# Patient Record
Sex: FEMALE | Race: WHITE | NOT HISPANIC OR LATINO | Employment: OTHER | URBAN - METROPOLITAN AREA
[De-identification: names, ages, dates, MRNs, and addresses within clinical notes are randomized per-mention and may not be internally consistent; named-entity substitution may affect disease eponyms.]

---

## 2017-05-15 ENCOUNTER — APPOINTMENT (OUTPATIENT)
Dept: SPEECH THERAPY | Facility: CLINIC | Age: 67
End: 2017-05-15
Payer: MEDICARE

## 2017-05-15 PROCEDURE — 92610 EVALUATE SWALLOWING FUNCTION: CPT

## 2017-05-15 PROCEDURE — G8997 SWALLOW GOAL STATUS: HCPCS

## 2017-05-15 PROCEDURE — G8996 SWALLOW CURRENT STATUS: HCPCS

## 2017-05-17 ENCOUNTER — APPOINTMENT (OUTPATIENT)
Dept: SPEECH THERAPY | Facility: CLINIC | Age: 67
End: 2017-05-17
Payer: MEDICARE

## 2017-05-17 PROCEDURE — 92526 ORAL FUNCTION THERAPY: CPT

## 2017-05-22 ENCOUNTER — APPOINTMENT (OUTPATIENT)
Dept: SPEECH THERAPY | Facility: CLINIC | Age: 67
End: 2017-05-22
Payer: MEDICARE

## 2017-05-22 PROCEDURE — 92526 ORAL FUNCTION THERAPY: CPT

## 2017-05-26 ENCOUNTER — APPOINTMENT (OUTPATIENT)
Dept: SPEECH THERAPY | Facility: CLINIC | Age: 67
End: 2017-05-26
Payer: MEDICARE

## 2017-06-01 ENCOUNTER — APPOINTMENT (OUTPATIENT)
Dept: SPEECH THERAPY | Facility: CLINIC | Age: 67
End: 2017-06-01
Payer: MEDICARE

## 2017-06-05 ENCOUNTER — APPOINTMENT (OUTPATIENT)
Dept: SPEECH THERAPY | Facility: CLINIC | Age: 67
End: 2017-06-05
Payer: MEDICARE

## 2017-06-07 ENCOUNTER — APPOINTMENT (OUTPATIENT)
Dept: SPEECH THERAPY | Facility: CLINIC | Age: 67
End: 2017-06-07
Payer: MEDICARE

## 2017-06-12 ENCOUNTER — APPOINTMENT (OUTPATIENT)
Dept: SPEECH THERAPY | Facility: CLINIC | Age: 67
End: 2017-06-12
Payer: MEDICARE

## 2017-06-12 PROCEDURE — 92526 ORAL FUNCTION THERAPY: CPT

## 2017-06-14 ENCOUNTER — APPOINTMENT (OUTPATIENT)
Dept: SPEECH THERAPY | Facility: CLINIC | Age: 67
End: 2017-06-14
Payer: MEDICARE

## 2017-06-14 PROCEDURE — G8997 SWALLOW GOAL STATUS: HCPCS

## 2017-06-14 PROCEDURE — 92526 ORAL FUNCTION THERAPY: CPT

## 2017-06-14 PROCEDURE — G8996 SWALLOW CURRENT STATUS: HCPCS

## 2017-06-19 ENCOUNTER — APPOINTMENT (OUTPATIENT)
Dept: SPEECH THERAPY | Facility: CLINIC | Age: 67
End: 2017-06-19
Payer: MEDICARE

## 2017-06-19 PROCEDURE — 92526 ORAL FUNCTION THERAPY: CPT

## 2017-06-21 ENCOUNTER — APPOINTMENT (OUTPATIENT)
Dept: SPEECH THERAPY | Facility: CLINIC | Age: 67
End: 2017-06-21
Payer: MEDICARE

## 2017-06-26 ENCOUNTER — APPOINTMENT (OUTPATIENT)
Dept: SPEECH THERAPY | Facility: CLINIC | Age: 67
End: 2017-06-26
Payer: MEDICARE

## 2017-06-26 PROCEDURE — 92526 ORAL FUNCTION THERAPY: CPT

## 2017-07-03 ENCOUNTER — APPOINTMENT (OUTPATIENT)
Dept: SPEECH THERAPY | Facility: CLINIC | Age: 67
End: 2017-07-03
Payer: MEDICARE

## 2017-07-03 PROCEDURE — 92526 ORAL FUNCTION THERAPY: CPT

## 2017-07-05 ENCOUNTER — APPOINTMENT (OUTPATIENT)
Dept: SPEECH THERAPY | Facility: CLINIC | Age: 67
End: 2017-07-05
Payer: MEDICARE

## 2017-07-05 PROCEDURE — 92526 ORAL FUNCTION THERAPY: CPT

## 2017-07-10 ENCOUNTER — APPOINTMENT (OUTPATIENT)
Dept: SPEECH THERAPY | Facility: CLINIC | Age: 67
End: 2017-07-10
Payer: MEDICARE

## 2017-07-10 PROCEDURE — 92526 ORAL FUNCTION THERAPY: CPT

## 2017-07-12 ENCOUNTER — APPOINTMENT (OUTPATIENT)
Dept: SPEECH THERAPY | Facility: CLINIC | Age: 67
End: 2017-07-12
Payer: MEDICARE

## 2017-07-12 PROCEDURE — 92526 ORAL FUNCTION THERAPY: CPT

## 2017-07-17 ENCOUNTER — APPOINTMENT (OUTPATIENT)
Dept: SPEECH THERAPY | Facility: CLINIC | Age: 67
End: 2017-07-17
Payer: MEDICARE

## 2017-07-17 PROCEDURE — 92526 ORAL FUNCTION THERAPY: CPT

## 2017-07-19 ENCOUNTER — APPOINTMENT (OUTPATIENT)
Dept: SPEECH THERAPY | Facility: CLINIC | Age: 67
End: 2017-07-19
Payer: MEDICARE

## 2017-07-19 PROCEDURE — 92526 ORAL FUNCTION THERAPY: CPT

## 2017-07-24 ENCOUNTER — APPOINTMENT (OUTPATIENT)
Dept: SPEECH THERAPY | Facility: CLINIC | Age: 67
End: 2017-07-24
Payer: MEDICARE

## 2017-07-24 PROCEDURE — 92526 ORAL FUNCTION THERAPY: CPT

## 2017-07-27 ENCOUNTER — APPOINTMENT (OUTPATIENT)
Dept: SPEECH THERAPY | Facility: CLINIC | Age: 67
End: 2017-07-27
Payer: MEDICARE

## 2017-07-27 PROCEDURE — 92526 ORAL FUNCTION THERAPY: CPT

## 2017-08-01 ENCOUNTER — APPOINTMENT (OUTPATIENT)
Dept: SPEECH THERAPY | Facility: CLINIC | Age: 67
End: 2017-08-01
Payer: MEDICARE

## 2017-08-01 PROCEDURE — 92526 ORAL FUNCTION THERAPY: CPT

## 2017-08-03 ENCOUNTER — APPOINTMENT (OUTPATIENT)
Dept: SPEECH THERAPY | Facility: CLINIC | Age: 67
End: 2017-08-03
Payer: MEDICARE

## 2017-08-03 PROCEDURE — 92526 ORAL FUNCTION THERAPY: CPT

## 2017-08-07 ENCOUNTER — APPOINTMENT (OUTPATIENT)
Dept: SPEECH THERAPY | Facility: CLINIC | Age: 67
End: 2017-08-07
Payer: MEDICARE

## 2017-08-10 ENCOUNTER — APPOINTMENT (OUTPATIENT)
Dept: SPEECH THERAPY | Facility: CLINIC | Age: 67
End: 2017-08-10
Payer: MEDICARE

## 2017-08-10 PROCEDURE — 92526 ORAL FUNCTION THERAPY: CPT

## 2017-08-14 ENCOUNTER — APPOINTMENT (OUTPATIENT)
Dept: SPEECH THERAPY | Facility: CLINIC | Age: 67
End: 2017-08-14
Payer: MEDICARE

## 2017-08-14 PROCEDURE — 92526 ORAL FUNCTION THERAPY: CPT

## 2017-08-16 ENCOUNTER — APPOINTMENT (OUTPATIENT)
Dept: SPEECH THERAPY | Facility: CLINIC | Age: 67
End: 2017-08-16
Payer: MEDICARE

## 2017-08-16 PROCEDURE — 92526 ORAL FUNCTION THERAPY: CPT

## 2017-08-21 ENCOUNTER — APPOINTMENT (OUTPATIENT)
Dept: SPEECH THERAPY | Facility: CLINIC | Age: 67
End: 2017-08-21
Payer: MEDICARE

## 2017-08-23 ENCOUNTER — APPOINTMENT (OUTPATIENT)
Dept: SPEECH THERAPY | Facility: CLINIC | Age: 67
End: 2017-08-23
Payer: MEDICARE

## 2017-08-28 ENCOUNTER — APPOINTMENT (OUTPATIENT)
Dept: SPEECH THERAPY | Facility: CLINIC | Age: 67
End: 2017-08-28
Payer: MEDICARE

## 2017-08-28 PROCEDURE — G8997 SWALLOW GOAL STATUS: HCPCS

## 2017-08-28 PROCEDURE — G8998 SWALLOW D/C STATUS: HCPCS

## 2017-08-28 PROCEDURE — 92526 ORAL FUNCTION THERAPY: CPT

## 2017-08-30 ENCOUNTER — APPOINTMENT (OUTPATIENT)
Dept: SPEECH THERAPY | Facility: CLINIC | Age: 67
End: 2017-08-30
Payer: MEDICARE

## 2019-06-18 PROBLEM — Q43.3: Status: ACTIVE | Noted: 2019-06-18

## 2021-01-23 ENCOUNTER — ANESTHESIA EVENT (OUTPATIENT)
Dept: GASTROENTEROLOGY | Facility: HOSPITAL | Age: 71
End: 2021-01-23

## 2021-01-23 PROBLEM — I10 HTN (HYPERTENSION): Status: ACTIVE | Noted: 2021-01-23

## 2021-01-23 PROBLEM — M79.7 FIBROMYALGIA: Status: ACTIVE | Noted: 2021-01-23

## 2021-01-23 PROBLEM — K44.9 HIATAL HERNIA: Status: ACTIVE | Noted: 2021-01-23

## 2021-01-23 PROBLEM — D64.9 ANEMIA: Status: ACTIVE | Noted: 2021-01-23

## 2021-01-23 NOTE — ANESTHESIA PREPROCEDURE EVALUATION
Procedure:  COLONOSCOPY  EGD    Relevant Problems   ANESTHESIA (within normal limits)      CARDIO   (+) HTN (hypertension)   (-) Chest pain      GI/HEPATIC   (+) Hiatal hernia      HEMATOLOGY   (+) Anemia      MUSCULOSKELETAL   (+) Fibromyalgia      NEURO/PSYCH   (+) Fibromyalgia        Physical Exam    Airway    Mallampati score: II  TM Distance: >3 FB  Neck ROM: full     Dental   upper dentures,     Cardiovascular  Cardiovascular exam normal    Pulmonary  Pulmonary exam normal     Other Findings        Anesthesia Plan  ASA Score- 2     Anesthesia Type- IV sedation with anesthesia with ASA Monitors  Additional Monitors:   Airway Plan:           Plan Factors-Exercise tolerance (METS): >4 METS  Chart reviewed  EKG reviewed  Existing labs reviewed  Patient is not a current smoker  Patient not instructed to abstain from smoking on day of procedure  Patient did not smoke on day of surgery  Induction-     Postoperative Plan-     Informed Consent- Anesthetic plan and risks discussed with patient  I personally reviewed this patient with the CRNA  Discussed and agreed on the Anesthesia Plan with the CRNA         Lab Results   Component Value Date    HGBA1C 6 2 (H) 12/22/2020       Lab Results   Component Value Date     08/29/2014    K 3 7 08/29/2014     (H) 08/29/2014    CO2 23 08/29/2014    ANIONGAP 6 08/29/2014    BUN 17 08/29/2014    CREATININE 0 96 08/29/2014    GLUCOSE 96 08/29/2014    CALCIUM 7 4 (L) 08/29/2014    AST 21 08/28/2014    ALT 31 08/28/2014    ALKPHOS 71 08/28/2014    PROT 7 7 08/28/2014    BILITOT 0 33 08/28/2014       Lab Results   Component Value Date    WBC 12 07 (H) 08/29/2014    HGB 10 1 (L) 08/29/2014    HCT 32 5 (L) 08/29/2014    MCV 84 08/29/2014     08/29/2014    ekg NSR in 2015

## 2021-01-25 ENCOUNTER — ANESTHESIA (OUTPATIENT)
Dept: GASTROENTEROLOGY | Facility: HOSPITAL | Age: 71
End: 2021-01-25

## 2021-01-25 ENCOUNTER — HOSPITAL ENCOUNTER (OUTPATIENT)
Dept: GASTROENTEROLOGY | Facility: HOSPITAL | Age: 71
Setting detail: OUTPATIENT SURGERY
Discharge: HOME | End: 2021-01-25
Attending: INTERNAL MEDICINE
Payer: MEDICARE

## 2021-01-25 VITALS
HEIGHT: 65 IN | WEIGHT: 200 LBS | DIASTOLIC BLOOD PRESSURE: 84 MMHG | TEMPERATURE: 96.6 F | HEART RATE: 66 BPM | SYSTOLIC BLOOD PRESSURE: 123 MMHG | RESPIRATION RATE: 16 BRPM | OXYGEN SATURATION: 99 % | BODY MASS INDEX: 33.32 KG/M2

## 2021-01-25 VITALS — HEART RATE: 63 BPM

## 2021-01-25 DIAGNOSIS — K22.70 LONG-SEGMENT BARRETT'S ESOPHAGUS: ICD-10-CM

## 2021-01-25 DIAGNOSIS — D50.9 IRON DEFICIENCY ANEMIA, UNSPECIFIED IRON DEFICIENCY ANEMIA TYPE: ICD-10-CM

## 2021-01-25 PROBLEM — G47.30 SLEEP APNEA: Status: ACTIVE | Noted: 2021-01-25

## 2021-01-25 PROBLEM — E66.9 OBESITY: Status: ACTIVE | Noted: 2021-01-25

## 2021-01-25 PROBLEM — F17.200 SMOKING: Status: ACTIVE | Noted: 2021-01-25

## 2021-01-25 PROCEDURE — 88341 IMHCHEM/IMCYTCHM EA ADD ANTB: CPT | Performed by: PATHOLOGY

## 2021-01-25 PROCEDURE — 88305 TISSUE EXAM BY PATHOLOGIST: CPT | Performed by: PATHOLOGY

## 2021-01-25 PROCEDURE — 43270 EGD LESION ABLATION: CPT | Performed by: INTERNAL MEDICINE

## 2021-01-25 PROCEDURE — 88342 IMHCHEM/IMCYTCHM 1ST ANTB: CPT | Performed by: PATHOLOGY

## 2021-01-25 PROCEDURE — 43239 EGD BIOPSY SINGLE/MULTIPLE: CPT | Performed by: INTERNAL MEDICINE

## 2021-01-25 PROCEDURE — 45378 DIAGNOSTIC COLONOSCOPY: CPT | Performed by: INTERNAL MEDICINE

## 2021-01-25 RX ORDER — SODIUM CHLORIDE 9 MG/ML
75 INJECTION, SOLUTION INTRAVENOUS CONTINUOUS
Status: CANCELLED | OUTPATIENT
Start: 2021-01-25

## 2021-01-25 RX ORDER — PROPOFOL 10 MG/ML
INJECTION, EMULSION INTRAVENOUS AS NEEDED
Status: DISCONTINUED | OUTPATIENT
Start: 2021-01-25 | End: 2021-01-25

## 2021-01-25 RX ORDER — SODIUM CHLORIDE 9 MG/ML
50 INJECTION, SOLUTION INTRAVENOUS CONTINUOUS
Status: DISCONTINUED | OUTPATIENT
Start: 2021-01-25 | End: 2021-01-29 | Stop reason: HOSPADM

## 2021-01-25 RX ORDER — SODIUM CHLORIDE 9 MG/ML
INJECTION, SOLUTION INTRAVENOUS CONTINUOUS PRN
Status: DISCONTINUED | OUTPATIENT
Start: 2021-01-25 | End: 2021-01-25

## 2021-01-25 RX ORDER — LIDOCAINE HYDROCHLORIDE 10 MG/ML
INJECTION, SOLUTION EPIDURAL; INFILTRATION; INTRACAUDAL; PERINEURAL AS NEEDED
Status: DISCONTINUED | OUTPATIENT
Start: 2021-01-25 | End: 2021-01-25

## 2021-01-25 RX ADMIN — SODIUM CHLORIDE: 0.9 INJECTION, SOLUTION INTRAVENOUS at 06:31

## 2021-01-25 RX ADMIN — PROPOFOL 50 MG: 10 INJECTION, EMULSION INTRAVENOUS at 08:08

## 2021-01-25 RX ADMIN — PROPOFOL 50 MG: 10 INJECTION, EMULSION INTRAVENOUS at 08:13

## 2021-01-25 RX ADMIN — PROPOFOL 50 MG: 10 INJECTION, EMULSION INTRAVENOUS at 07:58

## 2021-01-25 RX ADMIN — PROPOFOL 50 MG: 10 INJECTION, EMULSION INTRAVENOUS at 08:01

## 2021-01-25 RX ADMIN — PROPOFOL 150 MG: 10 INJECTION, EMULSION INTRAVENOUS at 07:54

## 2021-01-25 RX ADMIN — PROPOFOL 50 MG: 10 INJECTION, EMULSION INTRAVENOUS at 08:04

## 2021-01-25 RX ADMIN — LIDOCAINE HYDROCHLORIDE 50 MG: 10 INJECTION, SOLUTION EPIDURAL; INFILTRATION; INTRACAUDAL; PERINEURAL at 07:54

## 2021-01-25 RX ADMIN — SODIUM CHLORIDE 50 ML/HR: 0.9 INJECTION, SOLUTION INTRAVENOUS at 06:33

## 2021-01-25 NOTE — ANESTHESIA POSTPROCEDURE EVALUATION
Post-Op Assessment Note    CV Status:  Stable    Pain management: adequate     Mental Status:  Alert and awake   Hydration Status:  Stable   PONV Controlled:  None   Airway Patency:  Patent      Post Op Vitals Reviewed: Yes      Staff: CRNA         No complications documented      BP      Temp      Pulse     Resp      SpO2

## 2021-03-30 DIAGNOSIS — Z23 ENCOUNTER FOR IMMUNIZATION: ICD-10-CM

## 2021-04-08 ENCOUNTER — HOSPITAL ENCOUNTER (OUTPATIENT)
Dept: GASTROENTEROLOGY | Facility: HOSPITAL | Age: 71
Discharge: HOME | End: 2021-04-08
Attending: INTERNAL MEDICINE

## 2021-04-12 ENCOUNTER — HOSPITAL ENCOUNTER (OUTPATIENT)
Dept: GASTROENTEROLOGY | Facility: HOSPITAL | Age: 71
Discharge: HOME | End: 2021-04-12
Attending: INTERNAL MEDICINE
Payer: MEDICARE

## 2021-04-12 DIAGNOSIS — D50.9 IRON DEFICIENCY ANEMIA, UNSPECIFIED IRON DEFICIENCY ANEMIA TYPE: ICD-10-CM

## 2021-04-12 PROCEDURE — 91110 GI TRC IMG INTRAL ESOPH-ILE: CPT | Performed by: INTERNAL MEDICINE

## 2021-04-12 PROCEDURE — 91110 GI TRC IMG INTRAL ESOPH-ILE: CPT

## 2021-09-30 PROBLEM — K22.70 BARRETT'S ESOPHAGUS WITHOUT DYSPLASIA: Status: ACTIVE | Noted: 2021-09-30

## 2021-11-04 ENCOUNTER — TELEPHONE (OUTPATIENT)
Dept: GASTROENTEROLOGY | Facility: HOSPITAL | Age: 71
End: 2021-11-04

## 2021-11-05 ENCOUNTER — ANESTHESIA (OUTPATIENT)
Dept: GASTROENTEROLOGY | Facility: HOSPITAL | Age: 71
End: 2021-11-05

## 2021-11-05 ENCOUNTER — HOSPITAL ENCOUNTER (OUTPATIENT)
Dept: GASTROENTEROLOGY | Facility: HOSPITAL | Age: 71
Setting detail: OUTPATIENT SURGERY
Discharge: HOME | End: 2021-11-05
Attending: INTERNAL MEDICINE | Admitting: INTERNAL MEDICINE
Payer: MEDICARE

## 2021-11-05 ENCOUNTER — ANESTHESIA EVENT (OUTPATIENT)
Dept: GASTROENTEROLOGY | Facility: HOSPITAL | Age: 71
End: 2021-11-05

## 2021-11-05 VITALS
BODY MASS INDEX: 35.82 KG/M2 | OXYGEN SATURATION: 97 % | WEIGHT: 215 LBS | TEMPERATURE: 98.7 F | HEART RATE: 59 BPM | RESPIRATION RATE: 21 BRPM | SYSTOLIC BLOOD PRESSURE: 108 MMHG | DIASTOLIC BLOOD PRESSURE: 70 MMHG | HEIGHT: 65 IN

## 2021-11-05 DIAGNOSIS — K22.70 BARRETT'S ESOPHAGUS WITHOUT DYSPLASIA: ICD-10-CM

## 2021-11-05 DIAGNOSIS — K21.9 GASTROESOPHAGEAL REFLUX DISEASE WITHOUT ESOPHAGITIS: ICD-10-CM

## 2021-11-05 PROCEDURE — 88342 IMHCHEM/IMCYTCHM 1ST ANTB: CPT | Performed by: PATHOLOGY

## 2021-11-05 PROCEDURE — 88305 TISSUE EXAM BY PATHOLOGIST: CPT | Performed by: PATHOLOGY

## 2021-11-05 PROCEDURE — 43239 EGD BIOPSY SINGLE/MULTIPLE: CPT | Performed by: INTERNAL MEDICINE

## 2021-11-05 RX ORDER — ESTROGEN,CON/M-PROGEST ACET 0.625-2.5
1 TABLET ORAL DAILY
COMMUNITY
Start: 2021-05-12

## 2021-11-05 RX ORDER — SODIUM CHLORIDE 9 MG/ML
125 INJECTION, SOLUTION INTRAVENOUS CONTINUOUS
Status: DISCONTINUED | OUTPATIENT
Start: 2021-11-05 | End: 2021-11-09 | Stop reason: HOSPADM

## 2021-11-05 RX ORDER — LIDOCAINE HYDROCHLORIDE 20 MG/ML
INJECTION, SOLUTION EPIDURAL; INFILTRATION; INTRACAUDAL; PERINEURAL AS NEEDED
Status: DISCONTINUED | OUTPATIENT
Start: 2021-11-05 | End: 2021-11-05

## 2021-11-05 RX ORDER — PROPOFOL 10 MG/ML
INJECTION, EMULSION INTRAVENOUS AS NEEDED
Status: DISCONTINUED | OUTPATIENT
Start: 2021-11-05 | End: 2021-11-05

## 2021-11-05 RX ADMIN — SODIUM CHLORIDE 125 ML/HR: 0.9 INJECTION, SOLUTION INTRAVENOUS at 12:43

## 2021-11-05 RX ADMIN — LIDOCAINE HYDROCHLORIDE 100 MG: 20 INJECTION, SOLUTION EPIDURAL; INFILTRATION; INTRACAUDAL; PERINEURAL at 13:39

## 2021-11-05 RX ADMIN — PROPOFOL 120 MG: 10 INJECTION, EMULSION INTRAVENOUS at 13:39
